# Patient Record
Sex: MALE | Race: WHITE
[De-identification: names, ages, dates, MRNs, and addresses within clinical notes are randomized per-mention and may not be internally consistent; named-entity substitution may affect disease eponyms.]

---

## 2019-08-27 PROBLEM — Z00.00 ENCOUNTER FOR PREVENTIVE HEALTH EXAMINATION: Status: ACTIVE | Noted: 2019-08-27

## 2019-08-28 ENCOUNTER — LABORATORY RESULT (OUTPATIENT)
Age: 19
End: 2019-08-28

## 2019-08-28 ENCOUNTER — APPOINTMENT (OUTPATIENT)
Dept: UROLOGY | Facility: CLINIC | Age: 19
End: 2019-08-28
Payer: MEDICAID

## 2019-08-28 VITALS
SYSTOLIC BLOOD PRESSURE: 121 MMHG | TEMPERATURE: 97.8 F | OXYGEN SATURATION: 96 % | HEART RATE: 99 BPM | DIASTOLIC BLOOD PRESSURE: 87 MMHG

## 2019-08-28 DIAGNOSIS — Z78.9 OTHER SPECIFIED HEALTH STATUS: ICD-10-CM

## 2019-08-28 PROCEDURE — 99203 OFFICE O/P NEW LOW 30 MIN: CPT

## 2019-08-28 NOTE — ASSESSMENT
[FreeTextEntry1] : 19 year old male with primary infertility and low FSH.\par He has had no assessment of  semen analysis\par His examination is normal with excellent testicular size.\par We will proceed with repeat endocrine studies.\par If FSH is again low, he will undergo clomid stimulation with repeat labs.'\par DIscussed with Rabbi FLAVIO GALO \par [email or web address]\par Recommend semen analysis prior to initiating treatment\par

## 2019-08-28 NOTE — HISTORY OF PRESENT ILLNESS
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

## 2019-08-28 NOTE — PHYSICAL EXAM
[Heart Rate And Rhythm] : Heart rate and rhythm were normal [General Appearance - Well Developed] : well developed [Exaggerated Use Of Accessory Muscles For Inspiration] : no accessory muscle use [Bowel Sounds] : normal bowel sounds [Abdomen Soft] : soft [Urethral Meatus] : meatus normal [Epididymis] : the epididymides were normal [Testes Tenderness] : no tenderness of the testes [Testes Mass (___cm)] : there were no testicular masses [Normal Station and Gait] : the gait and station were normal for the patient's age [Skin Color & Pigmentation] : normal skin color and pigmentation [No Focal Deficits] : no focal deficits [Oriented To Time, Place, And Person] : oriented to person, place, and time [No Palpable Adenopathy] : no palpable adenopathy [FreeTextEntry1] : 4.5 x 3 cm bilaterally; no varicocele appreciated; vas x 2

## 2019-08-29 ENCOUNTER — MESSAGE (OUTPATIENT)
Age: 19
End: 2019-08-29

## 2019-08-29 LAB — PROLACTIN SERPL-MCNC: 12.1 NG/ML

## 2019-09-03 ENCOUNTER — OTHER (OUTPATIENT)
Age: 19
End: 2019-09-03

## 2019-09-24 ENCOUNTER — OTHER (OUTPATIENT)
Age: 19
End: 2019-09-24

## 2019-10-24 ENCOUNTER — APPOINTMENT (OUTPATIENT)
Dept: UROLOGY | Facility: CLINIC | Age: 19
End: 2019-10-24
Payer: MEDICAID

## 2019-10-24 VITALS
DIASTOLIC BLOOD PRESSURE: 81 MMHG | OXYGEN SATURATION: 98 % | TEMPERATURE: 98.3 F | SYSTOLIC BLOOD PRESSURE: 133 MMHG | HEART RATE: 72 BPM

## 2019-10-24 VITALS — HEIGHT: 67 IN | BODY MASS INDEX: 20.88 KG/M2 | WEIGHT: 133 LBS

## 2019-10-24 PROCEDURE — 99213 OFFICE O/P EST LOW 20 MIN: CPT

## 2019-10-24 NOTE — ASSESSMENT
[FreeTextEntry1] : no change in sexual functio\par no change in energy levels\par no change in libido\par Labs reviewed \par excellent response to clomid\par mild increase in Estradiol to abnormal range\par continue clomid \par will add \par anastrazole

## 2019-10-24 NOTE — HISTORY OF PRESENT ILLNESS
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

## 2019-10-24 NOTE — PHYSICAL EXAM
[General Appearance - Well Developed] : well developed [] : no rash [Edema] : no peripheral edema [FreeTextEntry1] : no gynecomastia

## 2019-11-27 ENCOUNTER — RX RENEWAL (OUTPATIENT)
Age: 19
End: 2019-11-27

## 2019-12-05 ENCOUNTER — APPOINTMENT (OUTPATIENT)
Dept: UROLOGY | Facility: CLINIC | Age: 19
End: 2019-12-05
Payer: MEDICAID

## 2019-12-05 PROCEDURE — 99213 OFFICE O/P EST LOW 20 MIN: CPT

## 2019-12-05 NOTE — HISTORY OF PRESENT ILLNESS
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

## 2019-12-05 NOTE — ASSESSMENT
[FreeTextEntry1] : 19 year old male with primary infertility and low FSH.\par He has had no assessment of  semen analysis\par His examination is normal with excellent testicular size.\par \par Had been started on clomid\par no change in sexual functio\par no change in energy levels\par no change in libido\par Labs reviewed \par No side effects\par \par When on clomid mild elevation of E2 and started on Anastrazole on 10.24.2019\par Labs on CLOMID and ANASTRAZOLE\par LH 18.9\par Testosterone 791.7 (it had been 631)\par FSH 5.8 (it hasd been 4.4)\par LH 18.9 (it had been 12.8)\par Estradiol 42.99 ( 7-49.06) it had been 52.91\par Explained physiology of HPG and aromatase\par Discussed the elevated LH in spite of T levels being excellent\par Will continue current regimen\par repeat in 3 months\par fu\par \par

## 2020-01-02 ENCOUNTER — RX RENEWAL (OUTPATIENT)
Age: 20
End: 2020-01-02

## 2020-01-30 ENCOUNTER — RX RENEWAL (OUTPATIENT)
Age: 20
End: 2020-01-30

## 2020-02-13 ENCOUNTER — APPOINTMENT (OUTPATIENT)
Dept: UROLOGY | Facility: CLINIC | Age: 20
End: 2020-02-13
Payer: MEDICAID

## 2020-02-13 PROCEDURE — 99214 OFFICE O/P EST MOD 30 MIN: CPT

## 2020-02-13 NOTE — HISTORY OF PRESENT ILLNESS
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 clomid\par 2. anastrazole\par no side effects

## 2020-02-13 NOTE — ASSESSMENT
[FreeTextEntry1] : 19 year old male with primary infertility and low FSH.\par He has had no assessment of  semen analysis\par His examination is normal with excellent testicular size.\par \par Had been started on clomid\par no change in sexual functio\par no change in energy levels\par no change in libido\par Labs reviewed \par No side effects\par \par When on clomid mild elevation of E2 and started on Anastrazole on 10.24.2019\par Labs on CLOMID and ANASTRAZOLE\par LH 18.9\par Testosterone 791.7 (it had been 631)\par FSH 5.8 (it hasd been 4.4)\par LH 18.9 (it had been 12.8)\par Estradiol 42.99 ( 7-49.06) it had been 52.91\par Explained physiology of HPG and aromatase\par Discussed the elevated LH in spite of T levels being excellent\par \par labs on clomid and anastrazole 1.24.2020\par Testosterone 1112\par free Testosterone 3328.19\par LH 19.1\par FSH 6.0\par prolactin 5.9\par estradiol 53.6\par Hbg 14.9\par Hct 45..8\par \par recommend decreasing clomid q od\par continue on anastrazole\par called rabbi  with patient and wife in room\par discussed reducing clomid q o d\par continue on anastrazole\par .repeat semen analysis \par wfe with ovarian dysfunction\par fu labs in 3 weeks\par fu in 4 weeks\par [20 million/ml] (lavage) (9/17/2019)\par 20% motiliity\par 1% normal\par \par needs repeat semen analysis\par will reduce clomid to qod \par will continue anastrazole\par repeat semen analysis\par \par repeat labs\par \par \par

## 2020-06-17 ENCOUNTER — APPOINTMENT (OUTPATIENT)
Dept: UROLOGY | Facility: CLINIC | Age: 20
End: 2020-06-17
Payer: MEDICAID

## 2020-06-17 VITALS — TEMPERATURE: 98.7 F

## 2020-06-17 DIAGNOSIS — E34.9 ENDOCRINE DISORDER, UNSPECIFIED: ICD-10-CM

## 2020-06-17 DIAGNOSIS — E28.0 ESTROGEN EXCESS: ICD-10-CM

## 2020-06-17 DIAGNOSIS — E29.1 TESTICULAR HYPOFUNCTION: ICD-10-CM

## 2020-06-17 LAB
ESTRADIOL SERPL-MCNC: 39.18
FSH SERPL-MCNC: 3.1
LH SERPL-ACNC: 10.4
TESTOST SERPL-MCNC: 709.7

## 2020-06-17 PROCEDURE — 99214 OFFICE O/P EST MOD 30 MIN: CPT

## 2020-06-17 RX ORDER — ANASTROZOLE TABLETS 1 MG/1
1 TABLET ORAL
Qty: 90 | Refills: 0 | Status: ACTIVE | COMMUNITY
Start: 2019-10-24 | End: 1900-01-01

## 2020-06-17 RX ORDER — CLOMIPHENE CITRATE 50 MG/1
50 TABLET ORAL
Qty: 15 | Refills: 3 | Status: ACTIVE | COMMUNITY
Start: 2019-08-28

## 2020-06-17 NOTE — ASSESSMENT
[FreeTextEntry1] : 19 year old male with primary infertility and low FSH.\par He has had no assessment of  semen analysis\par His examination is normal with excellent testicular size.\par \par Had been started on clomid\par no change in sexual functio\par no change in energy levels\par no change in libido\par Labs reviewed \par No side effects\par \par When on clomid mild elevation of E2 and started on Anastrazole on 10.24.2019\par Labs on CLOMID and ANASTRAZOLE\par LH 18.9\par Testosterone 791.7 (it had been 631)\par FSH 5.8 (it hasd been 4.4)\par LH 18.9 (it had been 12.8)\par Estradiol 42.99 ( 7-49.06) it had been 52.91\par Explained physiology of HPG and aromatase\par Discussed the elevated LH in spite of T levels being excellent\par \par labs on clomid and anastrazole 1.24.2020\par Testosterone 1112\par free Testosterone 3328.19\par LH 19.1\par FSH 6.0\par prolactin 5.9\par estradiol 53.6\par Hbg 14.9\par Hct 45..8\par \par recommend decreasing clomid q od\par continue on anastrazole\par called rabbi  with patient and wife in room\par discussed reducing clomid q o d\par continue on anastrazole\par .repeat semen analysis \par wfe with ovarian dysfunction\par fu labs in 3 weeks\par fu in 4 weeks\par [20 million/ml] (lavage) (9/17/2019)\par 20% motiliity\par 1% normal\par \par needs repeat semen analysis\par will reduce clomid to qod \par will continue anastrazole\par repeat semen analysis\par \par \par repeat labs\par \par \par 617.2020\par no side effects from clomid\par labs reviewed\par excellent hormonal profile\par last semen analysis in 3..20.2020\par volume  1.2  (postcoital leakage)\par [53]\par tc 25.44 million\par motility 40%\par morphology 1%\par \par semen analysis had progressively improved on clomid and anastrazole\par wife has had 3 cycles of clomid stimulation\par recommend adding proXeed to therapy\par discussed proceeding wth IUI in light of semen analysis parameter\par discussed IVF\par discussed potential significance of morphology abnormalites\par left Message with Bairon Ray and then discussed\par not willing to 'authorize' IUI for [employer] reasons at this point

## 2020-06-17 NOTE — HISTORY OF PRESENT ILLNESS
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 clomid\par 2. anastrazole\par no side effects\par \par 6.17.2020